# Patient Record
Sex: FEMALE | Race: WHITE
[De-identification: names, ages, dates, MRNs, and addresses within clinical notes are randomized per-mention and may not be internally consistent; named-entity substitution may affect disease eponyms.]

---

## 2017-12-22 ENCOUNTER — HOSPITAL ENCOUNTER (EMERGENCY)
Dept: HOSPITAL 56 - MW.ED | Age: 22
LOS: 1 days | Discharge: HOME | End: 2017-12-23
Payer: COMMERCIAL

## 2017-12-22 DIAGNOSIS — M62.830: Primary | ICD-10-CM

## 2017-12-22 PROCEDURE — 72100 X-RAY EXAM L-S SPINE 2/3 VWS: CPT

## 2017-12-22 PROCEDURE — 81001 URINALYSIS AUTO W/SCOPE: CPT

## 2017-12-22 PROCEDURE — 81025 URINE PREGNANCY TEST: CPT

## 2017-12-22 PROCEDURE — 96372 THER/PROPH/DIAG INJ SC/IM: CPT

## 2017-12-22 PROCEDURE — 99283 EMERGENCY DEPT VISIT LOW MDM: CPT

## 2017-12-22 NOTE — EDM.PDOC
ED HPI GENERAL MEDICAL PROBLEM





- General


Chief Complaint: Back Pain or Injury


Stated Complaint: BACK PAIN


Time Seen by Provider: 12/22/17 22:33


Source of Information: Reports: Patient





- History of Present Illness


INITIAL COMMENTS - FREE TEXT/NARRATIVE: 





HISTORY AND PHYSICAL:


History of present illness:


[Patient developed 4 out of 10 low back pain at 2 PM today, she attributes this 

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 apparently. She is able to rise out of a seated position she was able 

to remove her coat on her own without any significant pain behavior she does 

move slowly and deliberately however is not appeared to be in a great amount of 

distress.





On exam straight leg raise to 90 does not produce any radiculopathy or 

increased pain however letting her legs down to the bed did increase some 

discomfort but not to a significant level I can reproduce pain with palpation 

of her paraspinous muscles right greater than left in the lumbar region she did 

have some vertebral point tenderness per patient however x-rays are normal





No footdrop or saddle anesthesia bowel or urine symptoms





There is some language barrier but the patient does understand English and can 

communicate her sister-in-law's present and also serves as an  or 

clarification as needed





]


Review of systems: 


As per history of present illness and below otherwise all systems reviewed and 

negative.


Past medical history: 


As per history of present illness and as reviewed below otherwise 

noncontributory.


Surgical history: 


As per history of present illness and as reviewed below otherwise 

noncontributory.


Social history: 


No reported history of drug or alcohol abuse.


Family history: 


As per history of present illness and as reviewed below otherwise 

noncontributory.


Physical exam:


HEENT: Atraumatic, normocephalic, pupils reactive, negative for conjunctival 

pallor or scleral icterus, mucous membranes moist, throat clear, neck supple, 

nontender, trachea midline.


Lungs: Clear to auscultation, breath sounds equal bilaterally, chest nontender.


Heart: S1S2, regular, negative for clicks, rubs, or JVD.


Abdomen: Soft, nondistended, nontender. Negative for masses or 

hepatosplenomegaly. Negative for costovertebral tenderness.


Pelvis: Stable nontender.


Genitourinary: Deferred.


Rectal: Deferred.


Extremities: Atraumatic, negative for cords or calf pain. Neurovascular 

unremarkable.


Neuro: Awake, alert, oriented. Cranial nerves II through XII unremarkable. 

Cerebellum unremarkable. Motor and sensory unremarkable throughout. Exam 

nonfocal.


Diagnostics:


[]UA


HCG


Lumbar spine








Therapeutics:


[]Toradol 60 IM





Cataflam


Flexeril








Impression: 


[Paraspinous muscle spasm lumbar region]


Definitive disposition and diagnosis as appropriate pending reevaluation and 

review of above.


  ** lower back pain


Pain Score (Numeric/FACES): 10





- Related Data


 Allergies











Allergy/AdvReac Type Severity Reaction Status Date / Time


 


No Known Allergies Allergy   Verified 12/22/17 22:27











Home Meds: 


 Home Meds





. [No Known Home Meds]  12/22/17 [History]











ED ROS GENERAL





- Review of Systems


Review Of Systems: ROS reveals no pertinent complaints other than HPI.





ED EXAM, GENERAL





- Physical Exam


Exam: See Below





Course





- Vital Signs


Last Recorded V/S: 


 Last Vital Signs











Temp  98.3 F   12/22/17 22:28


 


Pulse  86   12/22/17 22:28


 


Resp  18   12/22/17 22:28


 


BP  125/85   12/22/17 22:28


 


Pulse Ox  98   12/22/17 22:28














- Orders/Labs/Meds


Orders: 


 Active Orders 24 hr











 Category Date Time Status


 


 Lumbar Spine 2 or 3V [CR] Stat Exams  12/22/17 22:40 Taken











Labs: 


 Laboratory Tests











  12/22/17 12/22/17 Range/Units





  22:39 22:39 


 


Urine Color   YELLOW  


 


Urine Appearance   HAZY  


 


Urine pH   6.0  (5.0-8.0)  


 


Ur Specific Gravity   1.025  (1.001-1.035)  


 


Urine Protein   NEGATIVE  (NEGATIVE)  mg/dL


 


Urine Glucose (UA)   NEGATIVE  (NEGATIVE)  mg/dL


 


Urine Ketones   NEGATIVE  (NEGATIVE)  mg/dL


 


Urine Occult Blood   LARGE H  (NEGATIVE)  


 


Urine Nitrite   NEGATIVE  (NEGATIVE)  


 


Urine Bilirubin   NEGATIVE  (NEGATIVE)  


 


Urine Urobilinogen   0.2  (<2.0)  EU/dL


 


Ur Leukocyte Esterase   NEGATIVE  (NEGATIVE)  


 


Urine RBC   0-4  (0-2/HPF)  


 


Urine WBC   0-3  (0-5/HPF)  


 


Ur Epithelial Cells   FEW  (NONE-FEW)  


 


Urine Bacteria   FEW  (NEGATIVE)  


 


Urine Mucus   LIGHT  (NONE-MOD)  


 


Urine HCG, Qual  NEGATIVE   (NEGATIVE)  











Meds: 


Medications














Discontinued Medications














Generic Name Dose Route Start Last Admin





  Trade Name Freq  PRN Reason Stop Dose Admin


 


Ketorolac Tromethamine  60 mg  12/22/17 22:53  12/22/17 23:22





  Toradol  IM  12/22/17 22:54  60 mg





  ONETIME ONE   Administration














Departure





- Departure


Time of Disposition: 23:40


Disposition: Home, Self-Care 01


Condition: Good


Clinical Impression: 


 Spasm of lumbar paraspinous muscle








- Discharge Information


Referrals: 


PCP,None [Primary Care Provider] - 


Forms:  ED Department Discharge


Additional Instructions: 


Medication as prescribed


Return if symptoms persist or worsen


Follow-up with primary care in 2 weeks sooner as needed





[facility] - Primary Care


[address] [place] [id number]


Phone: [phone]


Fax: [phone]








The following information is given to patients seen in the emergency department 

who are being discharged to home. This information is to outline your options 

for follow-up care. We provide all patients seen in our emergency department 

with a follow-up referral.





The need for follow-up, as well as the timing and circumstances, are variable 

depending upon the specifics of your emergency department visit.





If you don't have a primary care physician on staff, we will provide you with a 

referral. We always advise you to contact your personal physician following an 

emergency department visit to inform them of the circumstance of the visit and 

for follow-up with them and/or the need for any referrals to a consulting 

specialist.





The emergency department will also refer you to a specialist when appropriate. 

This referral assures that you have the opportunity for follow-up care with a 

specialist. All of these measure are taken in an effort to provide you with 

optimal care, which includes your follow-up.





Under all circumstances we always encourage you to contact your private 

physician who remains a resource for coordinating your care. When calling for 

follow-up care, please make the office aware that this follow-up is from your 

recent emergency room visit. If for any reason you are refused follow-up, 

please contact the [facility] emergency department at [phone] 

and asked to speak to the emergency department charge nurse.








- My Orders


Last 24 Hours: 


My Active Orders





12/22/17 22:40


Lumbar Spine 2 or 3V [CR] Stat 














- Assessment/Plan


Last 24 Hours: 


My Active Orders





12/22/17 22:40


Lumbar Spine 2 or 3V [CR] Stat

## 2017-12-26 NOTE — CR
EXAM DATE: 17



PATIENT'S AGE: 22





Patient: VIJAYA HILLIARD



Facility: [place], [place]

Patient ID: [id number]

Site Patient ID: [id number].

Site Accession #: [id number].

: 1995

Study: XRay Spine Lumbar [id number]/22/2017 11:24:52 PM

Ordering Physician: Lex Peña



Final Report: 

INDICATION: back pain after lifting heavy object



TECHNIQUE:

Lumbar spine 3 view



COMPARISON:

None 



FINDINGS:

Bones: No fractures or significant bone lesions. 

Joints: Disc spaces and facets are unremarkable. 

Soft tissues: Unremarkable. 



IMPRESSION:

No acute abnormality of the lumbar spine.



Dictated by Patrick Bassett MD @ 2017 11:27:43 PM





Dictated by: Patrick Bassett MD @ 2017 23:27:51

(Electronic Signature)



Report Signed by Proxy.
[facility]

## 2018-03-12 ENCOUNTER — HOSPITAL ENCOUNTER (EMERGENCY)
Dept: HOSPITAL 56 - MW.ED | Age: 23
Discharge: HOME | End: 2018-03-12
Payer: SELF-PAY

## 2018-03-12 DIAGNOSIS — J40: Primary | ICD-10-CM

## 2018-03-12 DIAGNOSIS — H66.003: ICD-10-CM

## 2018-03-12 NOTE — CR
EXAMINATION: Two-view chest (PA and Lateral views).

 

HISTORY: Shortness of breath.

 

FINDINGS: 

The trachea is midline. The cardiomediastinal silhouette is within normal limits. No pulmonary infilt
rates, effusions or pneumothorax.

 

Osseous structures appear unremarkable.

 

IMPRESSION: 

No acute cardiopulmonary process.

## 2018-03-12 NOTE — EDM.PDOC
ED HPI GENERAL MEDICAL PROBLEM





- General


Chief Complaint: Respiratory Problem


Stated Complaint: COUGH


Time Seen by Provider: 03/12/18 12:51


Source of Information: Reports: Patient


History Limitations: Reports: No Limitations





- History of Present Illness


INITIAL COMMENTS - FREE TEXT/NARRATIVE: 


HISTORY AND PHYSICAL:





History of present illness:


Patient is a 22-year-old female who presents to the emergency room with 

complaints of bilateral ear pain, sore throat, cough and chest congestion 3 

days. She states she is coughing so hard that it hurts her chest and throat. 

Denies any fever, chills, chest pain, shortness of breath, abdominal pain, 

nausea, vomiting or diarrhea/constipation. States she has been using over-the-

counter Tylenol without any relief.





Review of systems: 


As per history of present illness and below otherwise all systems reviewed and 

negative.





Past medical history: 


As per history of present illness and as reviewed below otherwise 

noncontributory.





Surgical history: 


As per history of present illness and as reviewed below otherwise 

noncontributory.





Social history: 


No reported history of drug or alcohol abuse.





Family history: 


As per history of present illness and as reviewed below otherwise 

noncontributory.





Physical exam:


General: Well-developed and well-nourished  22-year-old female. Alert 

and oriented. Nontoxic appearing and in no acute distress.


HEENT: Atraumatic, normocephalic, pupils reactive, negative for conjunctival 

pallor or scleral icterus, mucous membranes moist, pinkish tympanic membranes 

bilaterally with good light reflex, no bulging. Mild erythema noted to 

posterior oropharynx without exudate otherwise throat clear, neck supple, 

nontender, trachea midline.


Lungs: Fine inspiratory wheezing to right posterior base otherwise clear, 

breath sounds equal bilaterally, chest nontender.


Heart: S1S2, regular rate and rhythm


Abdomen: Soft, nondistended, nontender. Negative for masses or 

hepatosplenomegaly. Negative for costovertebral tenderness.


Pelvis: Stable nontender.


Genitourinary: Deferred.


Rectal: Deferred.


Extremities: Atraumatic, negative for cords or calf pain. Neurovascular 

unremarkable.


Neuro: Awake, alert, oriented. Cranial nerves II through XII unremarkable. 

Cerebellum unremarkable. Motor and sensory unremarkable throughout. Exam 

nonfocal.





Influenza screening is negative. Chest x-ray shows no pneumonia or infiltrate. 

We'll treat the ear infection and erythematous throat with Augmentin. Will 

treat cough with Medrol Dosepak and Phenergan with codeine, 4 ounces no refill. 

Supportive care measures were reviewed. She voices understanding and is 

agreeable to plan of care. She denies any questions at this time.





Diagnostics:


Influenza, chest x-ray





Therapeutics:


[]





Impression: 


Otitis media, bilateral


Bronchitis





Plan:


1. Please take the antibiotic as directed. A steroid has been prescribed to 

help open up your airways. Phenergan with codeine has been given for your cough 

and pain. The Phenergan with codeine is considered a narcotic and may make you 

drowsy so do not take it on driving or needing to be functioning outside of the 

house. Please reserve this medication for nighttime use. You may use Tylenol and

/or ibuprofen as needed for daytime use.


2. Warm saltwater gargles 2-3 times per day for the next several days. Please 

continue toothbrush after antibiotic is completed.


3. Encourage fluids to prevent dehydration.


4. Follow-up with your primary caregiver in the next 1-2 days. Return to the ED 

as needed and as discussed.





Definitive disposition and diagnosis as appropriate pending reevaluation and 

review of above.





Onset Date: 03/09/18


Duration: Day(s):


Location: Reports: Head, Neck, Chest


  ** throat


Pain Score (Numeric/FACES): 9





- Related Data


 Allergies











Allergy/AdvReac Type Severity Reaction Status Date / Time


 


No Known Allergies Allergy   Verified 03/12/18 12:55











Home Meds: 


 Home Meds





. [No Known Home Meds]  12/22/17 [History]











Past Medical History


HEENT History: Reports: None


Cardiovascular History: Reports: None


Respiratory History: Reports: None


Gastrointestinal History: Reports: None


Genitourinary History: Reports: None


OB/GYN History: Reports: None


Musculoskeletal History: Reports: None


Neurological History: Reports: None


Psychiatric History: Reports: None


Endocrine/Metabolic History: Reports: None


Hematologic History: Reports: None


Immunologic History: Reports: None


Oncologic (Cancer) History: Reports: None


Dermatologic History: Reports: None





- Infectious Disease History


Infectious Disease History: Reports: Chicken Pox, Other (See Below)


Other Infectious Disease History: childhood





- Past Surgical History


Head Surgeries/Procedures: Reports: None


HEENT Surgical History: Reports: None


Cardiovascular Surgical History: Reports: None


GI Surgical History: Reports: None


Female  Surgical History: Reports: None


Endocrine Surgical History: Reports: None


Musculoskeletal Surgical History: Reports: None


Oncologic Surgical History: Reports: None


Dermatological Surgical History: Reports: None





Social & Family History





- Family History


Family Medical History: Noncontributory





- Tobacco Use


Smoking Status *Q: Never Smoker


Second Hand Smoke Exposure: No





- Caffeine Use


Caffeine Use: Reports: None





- Recreational Drug Use


Recreational Drug Use: No





ED ROS GENERAL





- Review of Systems


Review Of Systems: ROS reveals no pertinent complaints other than HPI.





ED EXAM, GENERAL





- Physical Exam


Exam: See Below (See dictation)





Course





- Vital Signs


Last Recorded V/S: 


 Last Vital Signs











Temp  96.8 F   03/12/18 12:55


 


Pulse  88   03/12/18 12:55


 


Resp  18   03/12/18 12:55


 


BP  164/84 H  03/12/18 12:55


 


Pulse Ox  97   03/12/18 12:55














Departure





- Departure


Time of Disposition: 13:31


Disposition: Home, Self-Care 01


Clinical Impression: 


 Bronchitis





Otitis media


Qualifiers:


 Otitis media type: suppurative Chronicity: acute Laterality: bilateral 

Recurrence: not specified as recurrent Spontaneous tympanic membrane rupture: 

without spontaneous rupture Qualified Code(s): H66.003 - Acute suppurative 

otitis media without spontaneous rupture of ear drum, bilateral








- Discharge Information


Instructions:  Otitis Media, Adult, Easy-to-Read, Acute Bronchitis, Adult, Easy-

to-Read


Referrals: 


PCP,None [Primary Care Provider] - 


Forms:  ED Department Discharge


Additional Instructions: 





The following information is given to patients seen in the emergency department 

who are being discharged to home. This information is to outline your options 

for follow-up care. We provide all patients seen in our emergency department 

with a follow-up referral.





The need for follow-up, as well as the timing and circumstances, are variable 

depending upon the specifics of your emergency department visit.





If you don't have a primary care physician on staff, we will provide you with a 

referral. We always advise you to contact your personal physician following an 

emergency department visit to inform them of the circumstance of the visit and 

for follow-up with them and/or the need for any referrals to a consulting 

specialist.





The emergency department will also refer you to a specialist when appropriate. 

This referral assures that you have the opportunity for follow-up care with a 

specialist. All of these measure are taken in an effort to provide you with 

optimal care, which includes your follow-up.





Under all circumstances we always encourage you to contact your private 

physician who remains a resource for coordinating your care. When calling for 

follow-up care, please make the office aware that this follow-up is from your 

recent emergency room visit. If for any reason you are refused follow-up, 

please contact the [facility] Emergency 

Department at [phone] and asked to speak to the emergency department 

charge nurse.





[facility]


Primary Care


[facility] [place] [id number]


Phone: [phone]


Fax: [phone]





1. Please take the antibiotic as directed. A steroid has been prescribed to 

help open up your airways. Phenergan with codeine has been given for your cough 

and pain. The Phenergan with codeine is considered a narcotic and may make you 

drowsy so do not take it on driving or needing to be functioning outside of the 

house. Please reserve this medication for nighttime use. You may use Tylenol and

/or ibuprofen as needed for daytime use.


2. Warm saltwater gargles 2-3 times per day for the next several days. Please 

continue toothbrush after antibiotic is completed.


3. Encourage fluids to prevent dehydration.


4. Follow-up with your primary caregiver in the next 1-2 days. Return to the ED 

as needed and as discussed.

## 2018-05-14 ENCOUNTER — HOSPITAL ENCOUNTER (EMERGENCY)
Dept: HOSPITAL 56 - MW.ED | Age: 23
Discharge: HOME | End: 2018-05-14
Payer: COMMERCIAL

## 2018-05-14 DIAGNOSIS — T21.21XA: Primary | ICD-10-CM

## 2018-05-14 DIAGNOSIS — Z23: ICD-10-CM
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
## 2018-05-14 PROCEDURE — 90715 TDAP VACCINE 7 YRS/> IM: CPT

## 2018-05-14 PROCEDURE — 99283 EMERGENCY DEPT VISIT LOW MDM: CPT

## 2018-05-14 PROCEDURE — 90471 IMMUNIZATION ADMIN: CPT

## 2018-05-14 PROCEDURE — 16020 DRESS/DEBRID P-THICK BURN S: CPT
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
